# Patient Record
Sex: MALE | Race: WHITE | NOT HISPANIC OR LATINO | Employment: UNEMPLOYED | URBAN - METROPOLITAN AREA
[De-identification: names, ages, dates, MRNs, and addresses within clinical notes are randomized per-mention and may not be internally consistent; named-entity substitution may affect disease eponyms.]

---
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
## 2019-02-04 ENCOUNTER — OFFICE VISIT (OUTPATIENT)
Dept: FAMILY MEDICINE CLINIC | Facility: CLINIC | Age: 19
End: 2019-02-04
Payer: COMMERCIAL

## 2019-02-04 VITALS
HEART RATE: 84 BPM | SYSTOLIC BLOOD PRESSURE: 120 MMHG | OXYGEN SATURATION: 98 % | WEIGHT: 217 LBS | BODY MASS INDEX: 26.98 KG/M2 | DIASTOLIC BLOOD PRESSURE: 84 MMHG | HEIGHT: 75 IN

## 2019-02-04 DIAGNOSIS — H00.014 HORDEOLUM EXTERNUM OF LEFT UPPER EYELID: Primary | ICD-10-CM

## 2019-02-04 PROCEDURE — 1036F TOBACCO NON-USER: CPT | Performed by: NURSE PRACTITIONER

## 2019-02-04 PROCEDURE — 3008F BODY MASS INDEX DOCD: CPT | Performed by: NURSE PRACTITIONER

## 2019-02-04 PROCEDURE — 99213 OFFICE O/P EST LOW 20 MIN: CPT | Performed by: NURSE PRACTITIONER

## 2019-02-04 RX ORDER — ERYTHROMYCIN 5 MG/G
0.5 OINTMENT OPHTHALMIC
Qty: 3.5 G | Refills: 0 | Status: SHIPPED | OUTPATIENT
Start: 2019-02-04 | End: 2019-07-23 | Stop reason: ALTCHOICE

## 2019-02-04 NOTE — PROGRESS NOTES
[address]  [facility]        NAME: Evangelina Joshua is a 22 y o  male  : 2000    MRN: [id number]  DATE: 2019  TIME: 11:37 AM    Assessment and Plan   Hordeolum externum of left upper eyelid [H00 014]  1  Hordeolum externum of left upper eyelid  erythromycin (ILOTYCIN) ophthalmic ointment         Patient Instructions     Patient Instructions   Warm compressed to left eye as discussed  Erythromycin eye ointment as directed  Call or return with any problems or concerns  Chief Complaint     Chief Complaint   Patient presents with    Eye Problem     had Lump  for 1 month---swelled up over night          History of Present Illness       Bump on left upper eye x 1 month, became swollen x 1 day  did not try any treatments  Review of Systems   Review of Systems   Constitutional: Negative  HENT: Negative  Eyes: Positive for pain, redness and itching  Negative for photophobia, discharge and visual disturbance  Respiratory: Negative  Cardiovascular: Negative  Gastrointestinal: Negative  Allergic/Immunologic: Positive for environmental allergies (seasonal allergies)  Neurological: Negative  Current Medications       Current Outpatient Prescriptions:     erythromycin (ILOTYCIN) ophthalmic ointment, Administer 0 5 inches into the left eye every 4 (four) hours, Disp: 3 5 g, Rfl: 0    Current Allergies     Allergies as of 2019    (No Known Allergies)            The following portions of the patient's history were reviewed and updated as appropriate: allergies, current medications, past family history, past medical history, past social history, past surgical history and problem list      No past medical history on file      Past Surgical History:   Procedure Laterality Date    APPENDECTOMY      ND LAP,APPENDECTOMY N/A 2017    Procedure: APPENDECTOMY LAPAROSCOPIC;  Surgeon: Evie Hutton MD;  Location:  MAIN OR;  Service: General Family History   Problem Relation Age of Onset    No Known Problems Mother     No Known Problems Father     Hypertension Maternal Grandmother     Mental illness Neg Hx     Substance Abuse Neg Hx          Medications have been verified  Objective   /84   Pulse 84   Ht 6' 3" (1 905 m)   Wt 98 4 kg (217 lb)   SpO2 98%   BMI 27 12 kg/m²        Physical Exam     Physical Exam   Constitutional: He is oriented to person, place, and time  He appears well-developed and well-nourished  No distress  HENT:   Head: Normocephalic  Eyes: Pupils are equal, round, and reactive to light  Conjunctivae and EOM are normal  Right eye exhibits no discharge  Left eye exhibits no discharge  Cardiovascular: Normal rate, regular rhythm and normal heart sounds  No murmur heard  Pulmonary/Chest: Effort normal and breath sounds normal  No respiratory distress  He has no wheezes  Musculoskeletal: Normal range of motion  Neurological: He is alert and oriented to person, place, and time  Skin: Skin is warm and dry  He is not diaphoretic  Psychiatric: He has a normal mood and affect  His behavior is normal  Judgment and thought content normal    Nursing note and vitals reviewed

## 2019-02-04 NOTE — PATIENT INSTRUCTIONS
Warm compressed to left eye as discussed  Erythromycin eye ointment as directed  Call or return with any problems or concerns

## 2019-07-23 ENCOUNTER — OFFICE VISIT (OUTPATIENT)
Dept: FAMILY MEDICINE CLINIC | Facility: CLINIC | Age: 19
End: 2019-07-23
Payer: COMMERCIAL

## 2019-07-23 VITALS
OXYGEN SATURATION: 98 % | WEIGHT: 206.2 LBS | RESPIRATION RATE: 20 BRPM | DIASTOLIC BLOOD PRESSURE: 80 MMHG | TEMPERATURE: 97.9 F | HEIGHT: 74 IN | HEART RATE: 60 BPM | BODY MASS INDEX: 26.46 KG/M2 | SYSTOLIC BLOOD PRESSURE: 130 MMHG

## 2019-07-23 DIAGNOSIS — R19.7 DIARRHEA, UNSPECIFIED TYPE: ICD-10-CM

## 2019-07-23 DIAGNOSIS — R10.13 EPIGASTRIC PAIN: Primary | ICD-10-CM

## 2019-07-23 PROCEDURE — 99213 OFFICE O/P EST LOW 20 MIN: CPT | Performed by: NURSE PRACTITIONER

## 2019-07-23 NOTE — PATIENT INSTRUCTIONS
Increase fluids/rest  Slope diet  Do not drink alcohol  If pain worsens or becomes severe go to ER  Call or return for problems/concerns

## 2019-07-23 NOTE — PROGRESS NOTES
[address]  [facility]        NAME: Miguelina Lara is a 22 y o  male  : 2000    MRN: [id number]  DATE: 2019  TIME: 12:52 PM    Assessment and Plan   Epigastric pain [R10 13]  1  Epigastric pain     2  Diarrhea, unspecified type           Patient Instructions     Patient Instructions   Increase fluids/rest  [place]oster diet  Do not drink alcohol  If pain worsens or becomes severe go to ER  Call or return for problems/concerns              Chief Complaint     Chief Complaint   Patient presents with    Abdominal Pain     patient presents c/o pain located on the epigastric region for 5 days along with diarrhea   Back Pain     upper back pain, patient decline any injurys to back   Headache         History of Present Illness       abdominal started 2 days ago  Pain in mid to lower abdomen  Patient did have about 15 beers the day before pain stated  Also started with diarrhea 2 days ago  Abdominal pain was worse yesterday, improved today  No pain at present  Denies vomiting, nausea, fever  Had abdominal pain 1 5 weeks ago due to constipation  Pain improved after bowel movement  c/o upper back/neck pain  Does work moving furniture  Back pain last night 7/10 pain presently 3/10 did take Advil this am with relief      Review of Systems   Review of Systems   Constitutional: Positive for appetite change (decreased appetitie)  Negative for activity change, chills, diaphoresis and fever  HENT: Negative  Negative for congestion  Respiratory: Negative for chest tightness, shortness of breath and wheezing  Cardiovascular: Negative for chest pain and palpitations  Gastrointestinal: Positive for abdominal pain and diarrhea  Negative for abdominal distention, anal bleeding, blood in stool, constipation, nausea and vomiting  Genitourinary: Negative for difficulty urinating, flank pain and hematuria  Musculoskeletal: Positive for back pain and myalgias  Negative for arthralgias     Skin: Negative  Neurological: Positive for headaches  Negative for dizziness, weakness, light-headedness and numbness  Hematological: Does not bruise/bleed easily  Current Medications     No current outpatient medications on file  Current Allergies     Allergies as of 07/23/2019    (No Known Allergies)            The following portions of the patient's history were reviewed and updated as appropriate: allergies, current medications, past family history, past medical history, past social history, past surgical history and problem list      History reviewed  No pertinent past medical history  Past Surgical History:   Procedure Laterality Date    APPENDECTOMY      OK LAP,APPENDECTOMY N/A 1/2/2017    Procedure: APPENDECTOMY LAPAROSCOPIC;  Surgeon: Herrera Mccain MD;  Location:  MAIN OR;  Service: General       Family History   Problem Relation Age of Onset    No Known Problems Mother     No Known Problems Father     Hypertension Maternal Grandmother     Mental illness Neg Hx     Substance Abuse Neg Hx          Medications have been verified  Objective   /80 (BP Location: Left arm, Patient Position: Sitting, Cuff Size: Large)   Pulse 60   Temp 97 9 °F (36 6 °C) (Tympanic)   Resp 20   Ht 6' 2" (1 88 m)   Wt 93 5 kg (206 lb 3 2 oz)   SpO2 98%   BMI 26 47 kg/m²        Physical Exam     Physical Exam   Constitutional: He is oriented to person, place, and time  He appears well-developed and well-nourished  No distress  HENT:   Head: Normocephalic  Eyes: EOM are normal    Neck: Normal range of motion  Cardiovascular: Normal rate, regular rhythm and normal heart sounds  No murmur heard  Pulmonary/Chest: Effort normal and breath sounds normal  No respiratory distress  He has no wheezes  Abdominal: Soft  Normal appearance and bowel sounds are normal  He exhibits no distension, no ascites and no mass  There is no tenderness   There is no rigidity, no rebound, no guarding, no CVA tenderness, no tenderness at McBurney's point and negative Serra's sign  Musculoskeletal: Normal range of motion  He exhibits no edema, tenderness or deformity  Neurological: He is alert and oriented to person, place, and time  Skin: Skin is warm and dry  He is not diaphoretic  Psychiatric: He has a normal mood and affect  His behavior is normal  Judgment and thought content normal    Nursing note and vitals reviewed

## 2019-07-23 NOTE — LETTER
July 23, 2019     Patient: Taylor Lockhart   YOB: 2000   Date of Visit: 7/23/2019       To Whom it May Concern:    Taylor Lockhart is under my professional care  He was seen in my office on 7/23/2019  He may return to work on 7/25/2019  If you have any questions or concerns, please don't hesitate to call           Sincerely,          ABDIAS Padilla        CC: No Recipients

## 2019-07-24 ENCOUNTER — TELEPHONE (OUTPATIENT)
Dept: FAMILY MEDICINE CLINIC | Facility: CLINIC | Age: 19
End: 2019-07-24

## 2019-07-24 NOTE — TELEPHONE ENCOUNTER
PC pt states was told by murali that if he needed to extend work note to call   Pt is aware murali had left for the day she if off tomm--but another Doc may sign off--pt will be called when ready or any other questions

## 2019-07-24 NOTE — LETTER
[address] [facility] [address]  [place] [address]  Dept: [phone]    July 25, 2019     Patient: Bonita Sellers   YOB: 2000   Date of Visit: 07/23/2019       To Whom it May Concern:    Bonita Sellers is under my professional care  He was seen in our office on 07/23/2019  Please excuse him from work from 7/23/19-7/26/19  He may return to work on 07/27/2019 without limitations  If you have any questions or concerns, please don't hesitate to call           Sincerely,          ABDIAS Huston

## 2019-07-26 ENCOUNTER — OFFICE VISIT (OUTPATIENT)
Dept: GASTROENTEROLOGY | Facility: CLINIC | Age: 19
End: 2019-07-26
Payer: COMMERCIAL

## 2019-07-26 ENCOUNTER — TELEPHONE (OUTPATIENT)
Dept: GASTROENTEROLOGY | Facility: CLINIC | Age: 19
End: 2019-07-26

## 2019-07-26 VITALS
BODY MASS INDEX: 26.31 KG/M2 | HEART RATE: 76 BPM | WEIGHT: 205 LBS | SYSTOLIC BLOOD PRESSURE: 120 MMHG | HEIGHT: 74 IN | DIASTOLIC BLOOD PRESSURE: 78 MMHG

## 2019-07-26 DIAGNOSIS — R19.7 DIARRHEA, UNSPECIFIED TYPE: Primary | ICD-10-CM

## 2019-07-26 PROCEDURE — 99204 OFFICE O/P NEW MOD 45 MIN: CPT | Performed by: INTERNAL MEDICINE

## 2019-07-26 NOTE — PROGRESS NOTES
[facility] - Outpatient Consultation  Berto Howard 22 y o  male MRN: [id number]  Encounter: [id number]    ASSESSMENT AND PLAN:      1  Diarrhea, unspecified type  Acute, severe onset of diarrhea over the past 5 days  Having anywhere from 9-12 watery bowel movements daily  Constipation preceded the diarrhea approximately 3 weeks ago  Suspect an infectious colitis  Possibly C difficile versus enteric pathogens  The family dog also began with diarrhea at that same time  Unable to entirely exclude IBD or less likely microscopic colitis  - CBC and differential; Future  - Comprehensive metabolic panel; Future  - Clostridium difficile toxin by PCR; Future  - Bacterial Stool Pathogen,HARRISON; Future  - maintain adequate hydration  - brat diet  - will arrange for colonoscopy if the above tests are normal and diarrhea persists      Followup Appointment[de-identified]  6 weeks  ______________________________________________________________________    Chief Complaint   Patient presents with    Stomach pain and diarrhea     Referred by PCP       HPI:   Berto Howard is a 22y o  year old male who presents with an acute onset of diarrhea that began this past Sunday  At that time he had fever of 101  Having anywhere from 9-12 watery bowel movements a day  He actually began with a new onset of constipation approximately 3 weeks ago and then bowel habits returned to normal the following week, up until this past Sunday  Does not recall eating anything unusual   Can occur postprandial or independent of food  Denies any actual night wakening  No further fevers  Has lost about 6-7 lb over this time frame  The family dog began with diarrhea at the same time  Denies any antibiotic use or travel  Denies any rectal bleeding, nausea, vomiting or melena  Historical Information   History reviewed  No pertinent past medical history    Past Surgical History:   Procedure Laterality Date    APPENDECTOMY  PA LAP,APPENDECTOMY N/A 1/2/2017    Procedure: APPENDECTOMY LAPAROSCOPIC;  Surgeon: Levar Yeager MD;  Location:  MAIN OR;  Service: General     Social History     Substance and Sexual Activity   Alcohol Use Yes    Frequency: 2-3 times a week    Drinks per session: 1 or 2    Binge frequency: Never     Social History     Substance and Sexual Activity   Drug Use No     Social History     Tobacco Use   Smoking Status Never Smoker   Smokeless Tobacco Never Used     Family History   Problem Relation Age of Onset    No Known Problems Mother     No Known Problems Father     Hypertension Maternal Grandmother     Mental illness Neg Hx     Substance Abuse Neg Hx     Colon cancer Neg Hx     Colon polyps Neg Hx        Meds/Allergies   No current outpatient medications on file  No Known Allergies    PHYSICAL EXAM:    Blood pressure 120/78, pulse 76, height 6' 2" (1 88 m), weight 93 kg (205 lb)  Body mass index is 26 32 kg/m²  General Appearance: NAD, cooperative, alert  HEENT:  Normocephalic, atraumatic, anicteric  Neck:  trachea midline  Lungs:  Clear to auscultation bilaterally; no rales, rhonchi or wheezing; respirations unlabored   Heart[de-identified]  Regular rate and rhythm; no murmur, rub, or gallop    Abdomen:  Soft, non-tender, non-distended; normal bowel sounds; no masses, no organomegaly   Rectal:  Deferred   Extremities: No cyanosis, clubbing or edema   Skin:  No jaundice, rashes, or lesions   Back: No CVA tenderness  Psych: Normal affect, good eye contact  Neuro: No gross deficits, AAOx3    Lab Results:   Lab Results   Component Value Date    WBC 15 89 (H) 01/03/2017    HGB 12 5 01/03/2017    HCT 36 6 01/03/2017    MCV 90 01/03/2017     01/03/2017     Lab Results   Component Value Date    K 3 7 01/02/2017     01/02/2017    CO2 28 01/02/2017    BUN 15 01/02/2017    CREATININE 0 97 01/02/2017    CALCIUM 9 9 01/02/2017    AST 18 01/02/2017    ALT 26 01/02/2017    ALKPHOS 146 01/02/2017 No results found for: IRON, TIBC, FERRITIN  No results found for: LIPASE    Radiology Results:   No results found  REVIEW OF SYSTEMS:    CONSTITUTIONAL:  Admits to fevers and weight loss  HEENT: No earache or tinnitus  Denies hearing loss or visual disturbances  CARDIOVASCULAR: No chest pain or palpitations  RESPIRATORY: Denies any cough, hemoptysis, shortness of breath or dyspnea on exertion  GASTROINTESTINAL: As noted in the History of Present Illness  GENITOURINARY: No problems with urination  Denies any hematuria or dysuria  NEUROLOGIC: No dizziness or vertigo, denies headaches  MUSCULOSKELETAL: Denies any muscle or joint pain  SKIN: Denies skin rashes or itching  ENDOCRINE: Denies excessive thirst  Denies intolerance to heat or cold  PSYCHOSOCIAL: Denies depression or anxiety  Denies any recent memory loss

## 2019-07-26 NOTE — TELEPHONE ENCOUNTER
Fragmented VM mssg on Clinical line from Dad; states went to see doctors this wk; has been having stomach issues  For 3 wks now/last wk only diarrhea/nothing solid, just water; every time he eats gets sick/needs him to be seen; saw family dr/doesn't think they should have gone there/wants specialist for stomach (sounds like he said he is just 90 pounds); Dad is worried/wants him seen; asks for [id number]  Mssg was broken up, but it sounded like someone told him no appts  Called  to see if Rec spoke w/ caller but was told no one did  Martha Woodruff called and had call from Pt's father/checking w/ Teresa to see if Pt can be given same-day appt

## 2019-07-26 NOTE — PATIENT INSTRUCTIONS
Drink plenty of fluids  Recommend a brat diet that includes bananas rice applesauce and toast  Will obtain blood work and stool studies  Trial of Pepto-Bismol 2 tablets twice daily or if liquid 2 tbsp twice daily

## 2019-07-27 LAB
ALBUMIN SERPL-MCNC: 4.2 G/DL (ref 3.5–5.5)
ALBUMIN/GLOB SERPL: 1.6 {RATIO} (ref 1.2–2.2)
ALP SERPL-CCNC: 67 IU/L (ref 39–117)
ALT SERPL-CCNC: 15 IU/L (ref 0–44)
AST SERPL-CCNC: 13 IU/L (ref 0–40)
BASOPHILS # BLD AUTO: 0 X10E3/UL (ref 0–0.2)
BASOPHILS NFR BLD AUTO: 0 %
BILIRUB SERPL-MCNC: 0.3 MG/DL (ref 0–1.2)
BUN SERPL-MCNC: 16 MG/DL (ref 6–20)
BUN/CREAT SERPL: 18 (ref 9–20)
CALCIUM SERPL-MCNC: 9.4 MG/DL (ref 8.7–10.2)
CHLORIDE SERPL-SCNC: 100 MMOL/L (ref 96–106)
CO2 SERPL-SCNC: 24 MMOL/L (ref 20–29)
CREAT SERPL-MCNC: 0.88 MG/DL (ref 0.76–1.27)
EOSINOPHIL # BLD AUTO: 0.1 X10E3/UL (ref 0–0.4)
EOSINOPHIL NFR BLD AUTO: 1 %
ERYTHROCYTE [DISTWIDTH] IN BLOOD BY AUTOMATED COUNT: 13 % (ref 12.3–15.4)
GLOBULIN SER-MCNC: 2.7 G/DL (ref 1.5–4.5)
GLUCOSE SERPL-MCNC: 87 MG/DL (ref 65–99)
HCT VFR BLD AUTO: 41.1 % (ref 37.5–51)
HGB BLD-MCNC: 14.4 G/DL (ref 13–17.7)
IMM GRANULOCYTES # BLD: 0 X10E3/UL (ref 0–0.1)
IMM GRANULOCYTES NFR BLD: 0 %
LYMPHOCYTES # BLD AUTO: 1.5 X10E3/UL (ref 0.7–3.1)
LYMPHOCYTES NFR BLD AUTO: 21 %
MCH RBC QN AUTO: 30.6 PG (ref 26.6–33)
MCHC RBC AUTO-ENTMCNC: 35 G/DL (ref 31.5–35.7)
MCV RBC AUTO: 87 FL (ref 79–97)
MONOCYTES # BLD AUTO: 1 X10E3/UL (ref 0.1–0.9)
MONOCYTES NFR BLD AUTO: 13 %
NEUTROPHILS # BLD AUTO: 4.7 X10E3/UL (ref 1.4–7)
NEUTROPHILS NFR BLD AUTO: 65 %
PLATELET # BLD AUTO: 237 X10E3/UL (ref 150–450)
POTASSIUM SERPL-SCNC: 4.3 MMOL/L (ref 3.5–5.2)
PROT SERPL-MCNC: 6.9 G/DL (ref 6–8.5)
RBC # BLD AUTO: 4.7 X10E6/UL (ref 4.14–5.8)
SL AMB EGFR AFRICAN AMERICAN: 144 ML/MIN/1.73
SL AMB EGFR NON AFRICAN AMERICAN: 125 ML/MIN/1.73
SODIUM SERPL-SCNC: 140 MMOL/L (ref 134–144)
WBC # BLD AUTO: 7.2 X10E3/UL (ref 3.4–10.8)

## 2019-07-29 ENCOUNTER — TELEPHONE (OUTPATIENT)
Dept: GASTROENTEROLOGY | Facility: CLINIC | Age: 19
End: 2019-07-29

## 2019-08-01 LAB — C DIFF TOX GENS STL QL NAA+PROBE: NEGATIVE

## 2019-08-07 NOTE — TELEPHONE ENCOUNTER
Pt left [facility] questioning if his results are back yet?; had testing at [facility]; [facility] [phone]

## 2019-08-07 NOTE — TELEPHONE ENCOUNTER
Left message on answering machine that blood work and stool studies looks good and that if he continues with diarrhea he should schedule colonoscopy

## 2021-06-07 ENCOUNTER — HOSPITAL ENCOUNTER (EMERGENCY)
Facility: HOSPITAL | Age: 21
Discharge: HOME | End: 2021-06-07
Attending: EMERGENCY MEDICINE | Admitting: EMERGENCY MEDICINE
Payer: COMMERCIAL

## 2021-06-07 ENCOUNTER — APPOINTMENT (EMERGENCY)
Dept: RADIOLOGY | Facility: HOSPITAL | Age: 21
End: 2021-06-07
Payer: COMMERCIAL

## 2021-06-07 VITALS
HEIGHT: 75 IN | WEIGHT: 236 LBS | TEMPERATURE: 98.6 F | BODY MASS INDEX: 29.34 KG/M2 | RESPIRATION RATE: 16 BRPM | SYSTOLIC BLOOD PRESSURE: 130 MMHG | HEART RATE: 89 BPM | OXYGEN SATURATION: 95 % | DIASTOLIC BLOOD PRESSURE: 75 MMHG

## 2021-06-07 DIAGNOSIS — S93.402A LEFT ANKLE SPRAIN: Primary | ICD-10-CM

## 2021-06-07 PROCEDURE — 99283 EMERGENCY DEPT VISIT LOW MDM: CPT

## 2021-06-07 PROCEDURE — 73630 X-RAY EXAM OF FOOT: CPT

## 2021-06-07 PROCEDURE — 99284 EMERGENCY DEPT VISIT MOD MDM: CPT | Performed by: EMERGENCY MEDICINE

## 2021-06-07 PROCEDURE — 73610 X-RAY EXAM OF ANKLE: CPT

## 2021-06-07 NOTE — DISCHARGE INSTRUCTIONS
Take 1000 mg of acetaminophen (Tylenol) with 400 mg of ibuprofen (Advil) every 6-8 hours as needed for pain  Ankle Sprain   WHAT YOU NEED TO KNOW:   An ankle sprain happens when 1 or more ligaments in your ankle joint stretch or tear  Ligaments are tough tissues that connect bones  Ligaments support your joints and keep your bones in place  DISCHARGE INSTRUCTIONS:   Return to the emergency department if:   You have severe pain in your ankle  Your foot or toes are cold or numb  Your ankle becomes more weak or unstable (wobbly)  You are unable to put any weight on your ankle or foot  Your swelling has increased or returned  Call your doctor if:   Your pain does not go away, even after treatment  You have questions or concerns about your condition or care  Medicines: You may need any of the following:  NSAIDs , such as ibuprofen, help decrease swelling, pain, and fever  This medicine is available with or without a doctor's order  NSAIDs can cause stomach bleeding or kidney problems in certain people  If you take blood thinner medicine, always ask your healthcare provider if NSAIDs are safe for you  Always read the medicine label and follow directions  Acetaminophen  decreases pain and fever  It is available without a doctor's order  Ask how much to take and how often to take it  Follow directions  Read the labels of all other medicines you are using to see if they also contain acetaminophen, or ask your doctor or pharmacist  Acetaminophen can cause liver damage if not taken correctly  Do not use more than 4 grams (4,000 milligrams) total of acetaminophen in one day  Prescription pain medicine  may be given  Ask your healthcare provider how to take this medicine safely  Some prescription pain medicines contain acetaminophen  Do not take other medicines that contain acetaminophen without talking to your healthcare provider  Too much acetaminophen may cause liver damage   Prescription pain medicine may cause constipation  Ask your healthcare provider how to prevent or treat constipation  Take your medicine as directed  Contact your healthcare provider if you think your medicine is not helping or if you have side effects  Tell him or her if you are allergic to any medicine  Keep a list of the medicines, vitamins, and herbs you take  Include the amounts, and when and why you take them  Bring the list or the pill bottles to follow-up visits  Carry your medicine list with you in case of an emergency  Self-care:   Use support devices,  such as a brace, cast, or splint, to limit your movement and protect your joint  You may need to use crutches to decrease your pain as you move around  Go to physical therapy as directed  A physical therapist teaches you exercises to help improve movement and strength, and to decrease pain  Rest  your ankle so that it can heal  Return to normal activities as directed  Apply ice  on your ankle for 15 to 20 minutes every hour or as directed  Use an ice pack, or put crushed ice in a plastic bag  Cover it with a towel  Ice helps prevent tissue damage and decreases swelling and pain  Compress  your ankle  Ask if you should wrap an elastic bandage around your injured ligament  An elastic bandage provides support and helps decrease swelling and movement so your joint can heal  Wear as long as directed  Elevate  your ankle above the level of your heart as often as you can  This will help decrease swelling and pain  Prop your ankle on pillows or blankets to keep it elevated comfortably  Prevent another ankle sprain:   Let your ankle heal   Find out how long your ligament needs to heal  Do not do any physical activity until your healthcare provider says it is okay  If you start activity too soon, you may develop a more serious injury  Always warm up and stretch  before you exercise or play sports  Use the right equipment    Always wear shoes that fit well and are made for the activity that you are doing  You may also need ankle supports, elbow and knee pads, or braces  Follow up with your doctor as directed:  Write down your questions so you remember to ask them during your visits  © Copyright [address] Information is for End User's use only and may not be sold, redistributed or otherwise used for commercial purposes  All illustrations and images included in CareNotes® are the copyrighted property of A D A M , Inc  or [facility] Rosas Wilson   The above information is an  only  It is not intended as medical advice for individual conditions or treatments  Talk to your doctor, nurse or pharmacist before following any medical regimen to see if it is safe and effective for you

## 2021-06-10 ENCOUNTER — VBI (OUTPATIENT)
Dept: FAMILY MEDICINE CLINIC | Facility: CLINIC | Age: 21
End: 2021-06-10

## 2021-06-10 NOTE — TELEPHONE ENCOUNTER
Jose Ulrich    ED Visit Information     Ed visit date: 6/7/2021  Diagnosis Description: Left ankle sprain  In Network? Yes [address]  Discharge status: Home  Discharged with meds ? No  Number of ED visits to date: 1  ED Severity:NA     Outreach Information    Outreach successful: Yes 3  Date letter mailed:na  Date Finalized:  6/7/2021  Care Coordination    Follow up appointment with pcp: no None scheduled  Transportation issues ? NA    [employer] Outreach    Outreach type: 7 Day Outreach  Emergent necessity warranted by diagnosis: No  [facility] PCP: Yes  06/10/2021 11:19 AM Phone ([employer]) Lucita Neff (Self) [phone] (H)  Left Message  Unable to reach patient regarding his recent ED visit  2nd attempt will be made on 6/11/2021 06/11/2021 02:49 PM Phone ([employer]) Lucita Neff (Self) [phone] (H)   Left Message  Unable to reach patient regarding his recent ED visit  Final attempt will be made on 6/14/2021 06/14/2021 10:20 AM Phone ([employer]) Lucita Neff (Self) [phone] (H)   Left Message  Unable to reach patient regarding his recent ED visit  No ED follow up letter sent due to inactive Harshil

## (undated) DEVICE — SCD SEQUENTIAL COMPRESSION COMFORT SLEEVE MEDIUM KNEE LENGTH: Brand: KENDALL SCD

## (undated) DEVICE — ALLENTOWN LAP CHOLE APP PACK: Brand: CARDINAL HEALTH

## (undated) DEVICE — INTENDED FOR TISSUE SEPARATION, AND OTHER PROCEDURES THAT REQUIRE A SHARP SURGICAL BLADE TO PUNCTURE OR CUT.: Brand: BARD-PARKER SAFETY BLADES SIZE 15, STERILE

## (undated) DEVICE — DRESSING TELFA 2 X 3 IN STRL

## (undated) DEVICE — REM POLYHESIVE ADULT PATIENT RETURN ELECTRODE: Brand: VALLEYLAB

## (undated) DEVICE — 3M™ STERI-STRIP™ COMPOUND BENZOIN TINCTURE 40 BAGS/CARTON 4 CARTONS/CASE C1544: Brand: 3M™ STERI-STRIP™

## (undated) DEVICE — TRAY FOLEY 16FR URIMETER SURESTEP

## (undated) DEVICE — ENDOPATH ETS45 2.5MM RELOADS (VASCULAR/THIN): Brand: ENDOPATH

## (undated) DEVICE — SUT MONOCRYL 4-0 PS-2 27 IN Y426H

## (undated) DEVICE — NEEDLE 25G X 1 1/2

## (undated) DEVICE — GLOVE SRG BIOGEL 7

## (undated) DEVICE — ENDOPATH XCEL UNIVERSAL TROCAR STABLILITY SLEEVES: Brand: ENDOPATH XCEL

## (undated) DEVICE — PMI DISPOSABLE PUNCTURE CLOSURE DEVICE / SUTURE GRASPER: Brand: PMI

## (undated) DEVICE — ENDOPATH ETS-FLEX45 ARTICULATING ENDOSCOPIC LINEAR CUTTER, NO RELOAD: Brand: ENDOPATH

## (undated) DEVICE — HARMONIC ACE 5MM DIAMETER SHEARS 36CM SHAFT LENGTH + ADAPTIVE TISSUE TECHNOLOGY FOR USE WITH GENERATOR G11: Brand: HARMONIC ACE

## (undated) DEVICE — ETS45 RELOAD STANDARD 45MM: Brand: ENDOPATH

## (undated) DEVICE — BLUE HEAT SCOPE WARMER

## (undated) DEVICE — GLOVE SRG BIOGEL 6.5

## (undated) DEVICE — SUT PDS II 1 CT-1 27 IN Z347H

## (undated) DEVICE — ENDOPATH XCEL BLADELESS TROCARS WITH STABILITY SLEEVES: Brand: ENDOPATH XCEL

## (undated) DEVICE — CHLORAPREP HI-LITE 26ML ORANGE

## (undated) DEVICE — GLOVE INDICATOR PI UNDERGLOVE SZ 6.5 BLUE

## (undated) DEVICE — 3M™ STERI-STRIP™ REINFORCED ADHESIVE SKIN CLOSURES, R1547, 1/2 IN X 4 IN (12 MM X 100 MM), 6 STRIPS/ENVELOPE: Brand: 3M™ STERI-STRIP™

## (undated) DEVICE — GLOVE INDICATOR PI UNDERGLOVE SZ 7 BLUE

## (undated) DEVICE — 2000CC GUARDIAN II: Brand: GUARDIAN

## (undated) DEVICE — ENDOPOUCH RETRIEVER SPECIMEN RETRIEVAL BAGS: Brand: ENDOPOUCH RETRIEVER